# Patient Record
Sex: MALE | Race: WHITE | NOT HISPANIC OR LATINO | URBAN - METROPOLITAN AREA
[De-identification: names, ages, dates, MRNs, and addresses within clinical notes are randomized per-mention and may not be internally consistent; named-entity substitution may affect disease eponyms.]

---

## 2020-06-17 ENCOUNTER — OTHER (OUTPATIENT)
Dept: URBAN - METROPOLITAN AREA CLINIC 19 | Facility: CLINIC | Age: 25
Setting detail: DERMATOLOGY
End: 2020-06-17

## 2020-06-17 DIAGNOSIS — C44.619 BASAL CELL CARCINOMA OF SKIN OF LEFT UPPER LIMB, INCLUDING SHOULDER: ICD-10-CM

## 2020-06-17 PROBLEM — L21.8 OTHER SEBORRHEIC DERMATITIS: Status: RESOLVED | Noted: 2020-06-17

## 2020-06-17 PROCEDURE — 11104 PUNCH BX SKIN SINGLE LESION: CPT

## 2020-07-16 ENCOUNTER — RX ONLY (RX ONLY)
Age: 25
End: 2020-07-16

## 2020-07-16 ENCOUNTER — FOLLOW-UP (OUTPATIENT)
Dept: URBAN - METROPOLITAN AREA CLINIC 19 | Facility: CLINIC | Age: 25
Setting detail: DERMATOLOGY
End: 2020-07-16

## 2020-07-16 DIAGNOSIS — C44.229 SQUAMOUS CELL CARCINOMA OF SKIN OF LEFT EAR AND EXTERNAL AURICULAR CANAL: ICD-10-CM

## 2020-07-16 PROCEDURE — 99213 OFFICE O/P EST LOW 20 MIN: CPT

## 2020-07-16 RX ORDER — CICLOPIROX 10 MG/.96ML
1 A SMALL AMOUNT SHAMPOO TOPICAL ONCE A DAY
Qty: 120 | Refills: 2
Start: 2020-07-16

## 2020-07-17 ENCOUNTER — RX ONLY (RX ONLY)
Age: 25
End: 2020-07-17

## 2020-07-17 RX ORDER — CALCIPOTRIENE AND BETAMETHASONE DIPROPIONATE 50; .5 UG/G; MG/G
A SMALL AMOUNT AEROSOL, FOAM TOPICAL ONCE A DAY
Qty: 60 | Refills: 3
Start: 2020-07-17

## 2020-11-12 RX ORDER — PREDNISONE 10 MG/1
1 TABLET TABLET ORAL ONCE A DAY
Qty: 15 | Refills: 0
Start: 2020-11-12

## 2020-11-12 RX ORDER — BETAMETHASONE DIPROPIONATE 0.5 MG/G
A SMALL AMOUNT CREAM TOPICAL ONCE A DAY
Qty: 60 | Refills: 3
Start: 2020-11-12

## 2022-01-31 ENCOUNTER — RX ONLY (RX ONLY)
Age: 27
End: 2022-01-31

## 2022-01-31 ENCOUNTER — FOLLOW-UP (OUTPATIENT)
Dept: URBAN - METROPOLITAN AREA CLINIC 19 | Facility: CLINIC | Age: 27
Setting detail: DERMATOLOGY
End: 2022-01-31

## 2022-01-31 DIAGNOSIS — L57.0 ACTINIC KERATOSIS: ICD-10-CM

## 2022-01-31 PROCEDURE — 99214 OFFICE O/P EST MOD 30 MIN: CPT

## 2022-01-31 RX ORDER — CALCIPOTRIENE AND BETAMETHASONE DIPROPIONATE 50; .5 UG/G; MG/G
A SMALL AMOUNT AEROSOL, FOAM TOPICAL ONCE A DAY
Qty: 60 | Refills: 3
Start: 2022-01-31

## 2022-02-01 ENCOUNTER — RX ONLY (RX ONLY)
Age: 27
End: 2022-02-01

## 2022-02-01 RX ORDER — RISANKIZUMAB-RZAA 150 MG/ML
1 PEN INJECTOR INJECTION SUBCUTANEOUS AS DIRECTED
Qty: 1 | Refills: 4
Start: 2022-02-01

## 2022-02-03 ENCOUNTER — RX ONLY (RX ONLY)
Age: 27
End: 2022-02-03

## 2022-02-03 RX ORDER — RISANKIZUMAB-RZAA 150 MG/ML
1 PEN INJECTOR INJECTION SUBCUTANEOUS AS DIRECTED
Qty: 1 | Refills: 4
Start: 2022-02-03

## 2022-03-04 ENCOUNTER — OTHER (OUTPATIENT)
Dept: URBAN - METROPOLITAN AREA CLINIC 100 | Facility: CLINIC | Age: 27
Setting detail: DERMATOLOGY
End: 2022-03-04

## 2022-03-04 DIAGNOSIS — D48.5 NEOPLASM OF UNCERTAIN BEHAVIOR OF SKIN: ICD-10-CM

## 2022-03-04 PROCEDURE — 96372 THER/PROPH/DIAG INJ SC/IM: CPT

## 2022-04-27 ENCOUNTER — RX ONLY (RX ONLY)
Age: 27
End: 2022-04-27

## 2022-04-27 RX ORDER — TRIAMCINOLONE ACETONIDE 1 MG/G
A SMALL AMOUNT CREAM TOPICAL TWICE A DAY
Qty: 45 | Refills: 2
Start: 2022-04-27

## 2023-03-06 ENCOUNTER — RX ONLY (RX ONLY)
Age: 28
End: 2023-03-06

## 2023-03-06 RX ORDER — RISANKIZUMAB-RZAA 150 MG/ML
INJECTION SUBCUTANEOUS
Qty: 1 | Refills: 6 | Status: ERX | COMMUNITY
Start: 2023-03-06

## 2023-05-12 ENCOUNTER — APPOINTMENT (OUTPATIENT)
Dept: URBAN - METROPOLITAN AREA CLINIC 192 | Age: 28
Setting detail: DERMATOLOGY
End: 2023-05-15

## 2023-05-12 DIAGNOSIS — L40.0 PSORIASIS VULGARIS: ICD-10-CM

## 2023-05-12 PROCEDURE — OTHER ORDER TESTS: OTHER

## 2023-05-12 PROCEDURE — OTHER MIPS QUALITY: OTHER

## 2023-05-12 PROCEDURE — 99213 OFFICE O/P EST LOW 20 MIN: CPT

## 2023-05-12 PROCEDURE — OTHER COUNSELING: OTHER

## 2023-05-12 ASSESSMENT — LOCATION DETAILED DESCRIPTION DERM: LOCATION DETAILED: POSTERIOR MID-PARIETAL SCALP

## 2023-05-12 ASSESSMENT — BSA PSORIASIS: % BODY COVERED IN PSORIASIS: 3

## 2023-05-12 ASSESSMENT — LOCATION ZONE DERM: LOCATION ZONE: SCALP

## 2023-05-12 ASSESSMENT — LOCATION SIMPLE DESCRIPTION DERM: LOCATION SIMPLE: POSTERIOR SCALP

## 2023-05-12 NOTE — PROCEDURE: ORDER TESTS
Bill For Surgical Tray: no
Lab Facility: [id number]
Performing Laboratory: [id number]
Expected Date Of Service: 05/12/2023
Billing Type: Third-Party Bill

## 2023-05-15 ENCOUNTER — RX ONLY (RX ONLY)
Age: 28
End: 2023-05-15

## 2023-05-15 RX ORDER — RISANKIZUMAB-RZAA 150 MG/ML
INJECTION SUBCUTANEOUS
Qty: 1 | Refills: 4 | Status: ACTIVE

## 2023-08-22 ENCOUNTER — RX ONLY (RX ONLY)
Age: 28
End: 2023-08-22

## 2023-08-22 RX ORDER — RISANKIZUMAB-RZAA 150 MG/ML
INJECTION SUBCUTANEOUS
Qty: 1 | Refills: 6 | Status: ACTIVE

## 2023-08-23 ENCOUNTER — RX ONLY (RX ONLY)
Age: 28
End: 2023-08-23

## 2023-08-23 RX ORDER — RISANKIZUMAB-RZAA 150 MG/ML
INJECTION SUBCUTANEOUS
Qty: 1 | Refills: 6 | Status: ERX